# Patient Record
Sex: FEMALE | Race: WHITE | NOT HISPANIC OR LATINO | Employment: PART TIME
[De-identification: names, ages, dates, MRNs, and addresses within clinical notes are randomized per-mention and may not be internally consistent; named-entity substitution may affect disease eponyms.]

---

## 2017-10-22 ENCOUNTER — HEALTH MAINTENANCE LETTER (OUTPATIENT)
Age: 32
End: 2017-10-22

## 2020-02-24 ENCOUNTER — HEALTH MAINTENANCE LETTER (OUTPATIENT)
Age: 35
End: 2020-02-24

## 2020-12-13 ENCOUNTER — HEALTH MAINTENANCE LETTER (OUTPATIENT)
Age: 35
End: 2020-12-13

## 2021-03-01 ENCOUNTER — HOSPITAL ENCOUNTER (OUTPATIENT)
Dept: MEDSURG UNIT | Facility: CLINIC | Age: 36
Discharge: HOME | End: 2021-03-02
Attending: ADVANCED PRACTICE MIDWIFE | Admitting: OBSTETRICS & GYNECOLOGY

## 2021-03-01 ASSESSMENT — MIFFLIN-ST. JEOR: SCORE: 1498.75

## 2021-03-03 ENCOUNTER — HOSPITAL ENCOUNTER (OUTPATIENT)
Dept: MEDSURG UNIT | Facility: CLINIC | Age: 36
Discharge: HOME | End: 2021-03-03
Attending: MIDWIFE | Admitting: OBSTETRICS & GYNECOLOGY

## 2021-03-03 ASSESSMENT — MIFFLIN-ST. JEOR: SCORE: 1503.28

## 2021-03-11 ENCOUNTER — COMMUNICATION - HEALTHEAST (OUTPATIENT)
Dept: SCHEDULING | Facility: CLINIC | Age: 36
End: 2021-03-11

## 2021-03-11 ENCOUNTER — ANESTHESIA - HEALTHEAST (OUTPATIENT)
Dept: OBGYN | Facility: CLINIC | Age: 36
End: 2021-03-11

## 2021-03-14 ENCOUNTER — RECORDS - HEALTHEAST (OUTPATIENT)
Dept: ADMINISTRATIVE | Facility: OTHER | Age: 36
End: 2021-03-14

## 2021-04-17 ENCOUNTER — HEALTH MAINTENANCE LETTER (OUTPATIENT)
Age: 36
End: 2021-04-17

## 2021-06-05 VITALS — WEIGHT: 185 LBS | BODY MASS INDEX: 32.78 KG/M2 | HEIGHT: 63 IN

## 2021-06-05 VITALS — WEIGHT: 184 LBS | HEIGHT: 63 IN | BODY MASS INDEX: 32.6 KG/M2

## 2021-06-15 NOTE — ANESTHESIA PROCEDURE NOTES
Epidural Block    Patient location during procedure: OB  Time Called: 3/11/2021 1:58 AM  Reason for Block:labor epidural  Staffing:  Performing  Anesthesiologist: Cristian Figueroa MD  Preanesthetic Checklist  Completed: patient identified, risks, benefits, and alternatives discussed, timeout performed, consent obtained, at patient's request, airway assessed, oxygen available, suction available, emergency drugs available and hand hygiene performed  Procedure  Patient position: sitting  Prep: ChloraPrep  Patient monitoring: continuous pulse oximetry and blood pressure  Approach: midline  Location: L3-L4  Injection technique: ESPERANZA air  Number of Attempts:2  Needle  Needle type: Tuohy   Needle gauge: 18 G     Catheter in Space: 4  Assessment  Sensory level:  No complications      Additional Notes:  Placed on 2nd attempt, catheter threaded easily without pain or paresthesias, negative aspiration and test dose negative. Patient more comfortable after procedure.

## 2021-06-15 NOTE — ANESTHESIA POSTPROCEDURE EVALUATION
Patient: Mary Shane  * No procedures listed *  Anesthesia type: epidural    Patient location: Labor and Delivery  Last vitals: No vitals data found for the desired time range.    Post vital signs: stable  Level of consciousness: awake and responds to simple questions  Post-anesthesia pain: pain controlled  Post-anesthesia nausea and vomiting: no  Pulmonary: unassisted, return to baseline  Cardiovascular: stable and blood pressure at baseline  Hydration: adequate  Anesthetic events: no    QCDR Measures:  ASA# 11 - Sania-op Cardiac Arrest: ASA11B - Patient did NOT experience unanticipated cardiac arrest  ASA# 12 - Sania-op Mortality Rate: ASA12B - Patient did NOT die  ASA# 13 - PACU Re-Intubation Rate: NA - No ETT / LMA used for case  ASA# 10 - Composite Anes Safety: ASA10A - No serious adverse event    Additional Notes:

## 2021-06-15 NOTE — PROGRESS NOTES
Patient arrives in triage with complaints of regular uterine contractions. She rates contractions at a 3 on the pain scale. Palpates mild. FHR category 1. Cervix unchanged from clinic and also unchanged after spending an hour in triage. Patient offered vistaril, but declined. Early labor signs reviewed with patient. Patient and  verbalize understanding and deny further questions at this time. Discharged home in stable condition, per Nita Guillory CNM.

## 2021-06-15 NOTE — PROGRESS NOTES
FHR remains reactive category 1 tracing. Patient continues to contract every 2-6 minutes, pal[singleton mild, patient does not feel contractions. SKYLER Juan CNM consulted with Dr. Mullen OB/GYN. Plan to discharge patient to home at this time to follow-up in clinic on Friday.

## 2021-06-15 NOTE — ANESTHESIA PREPROCEDURE EVALUATION
Anesthesia Evaluation      Patient summary reviewed   No history of anesthetic complications     Airway   Mallampati: II  Neck ROM: full   Pulmonary - negative ROS and normal exam                          Cardiovascular - negative ROS and normal exam   Neuro/Psych - negative ROS     Endo/Other    (+) obesity, pregnant     GI/Hepatic/Renal - negative ROS           Dental - normal exam                        Anesthesia Plan  Planned anesthetic: epidural  Patient identified, chart reviewed, and patient interviewed and examined. Benefits and risks of procedure discussed including bleeding, infection, nerve injury, hypotension, unilateral or failed block, headache. Patient desires to proceed. Time out performed prior to block placement and infusion initiation.       ASA 2     Anesthetic plan and risks discussed with: patient

## 2021-06-15 NOTE — DISCHARGE SUMMARY
"S: Mary is a 36yo  at 39w6d presented to l/d with complaints of regular contractions.  Pt reports that contractions do not feel very strong.  Pt is accompanied by her .  O: /82 (Patient Position: Semi-shaikh, Cuff Size: Adult Regular)   Pulse 79   Temp 98.6  F (37  C) (Oral)   Resp 20   Ht 5' 3\" (1.6 m)   Wt 184 lb (83.5 kg)   BMI 32.59 kg/m    FHR Cat I  Ctx q 3-6min mild to palp  Cx FT/thick/high  A: IUP at term with contractions  P: No cervical change after one hour  Reviewed active labor with pt and her   Pt is agreeable to go home  Pt declines vistaril for rest  Pt is to f/u in clinic this week    Nita Guillory CNM  Total time with pt =15min including reviewing chart, direct patient care and documentation.  "

## 2021-06-15 NOTE — PROGRESS NOTES
BPP 6/8 with 0 points for fetal tone. SKYLER Juan CNM notified. CNM has consulted with Dr. Mullen OB/GYN who recommends further observation x 1 hour and re-evaluate. Patient notified of above. Diet order obtained, menu given.

## 2021-06-15 NOTE — PROGRESS NOTES
"Outpatient/Triage Note:    Patient Name:  Mary Shane  :      1985  MRN:      [id number]    Assessment:   @ 40w0d   Repeat BPP at [facility]  (off for tone)  Extended monitoring with category one tracing with many accels    Plan:     - Discharge to home undelivered. Reviewed warning signs including decreased fetal movement, leaking of fluid, vaginal bleeding, or signs of labor. Reviewed how to contact on-call CNM. Follow-up in clinic on Friday 3/5/21 with CNM as scheduled or sooner as needed. All questions answered. Agrees with plan.     Subjective  Mary Shane is a 35 y.o.  who presented to [facility] for evaluation of failed BPP in clinic. Patient endorses \"lots of fetal movement\" once on monitors in triage.  Many fetal movements visualized and palpated by Rn/CNM. Denies leaking of fluid, bleeding, or changes in fetal movement.     Objective  Temp:  [98.1  F (36.7  C)] 98.1  F (36.7  C)  Heart Rate:  [58] 58  Resp:  [16] 16  BP: (129)/(80) 129/80    Physical Exam:  General appearance:  comfortable  Psych: AAO x3  Skin: Pink, warm & dry  HEENT: unremarkable  Cardiovascular:  RRR, S1, S2, no extra sounds or murmurs  Respiratory:  breath sounds CTA bilaterally, anteriorly and posteriorly  Abdomen: soft, NT, gravid  Leopolds: Vertex         EFW:<4500 grams (AGA)     FHR:Baseline: 130 bpm, Variability: Moderate (6 - 25 bpm), Accelerations: \"present and Decelerations: Absent    Uterine contractions:TocoFrequency: Every 2-7 minutes, Duration: 60 seconds and Intensity: mild    SVE: very posterior, unable to palpate    Discussed maternal/fetal status, NST and BPP with Dr. Mullen. OB recommends extended monitoring x 1 hr following hospital BPP and f/u in clinic on Friday 3/5/21.       Total time spent with patient:15 minutes, >50% spent on counseling and coordination of care.    Provider:  SARWAT Grayson,RUBIA    Date:  3/3/2021  Time:  2:36 PM              "

## 2021-06-15 NOTE — PROGRESS NOTES
, EDC 3-3-21, presents to [facility] triage,  accompanied by spouse,after BPP in clinic of . EFM placed. Reactive category 1 tracing obtained. Contractions noted every 2-6 minutes. Palpate mild, patient does not feel them. Hydration offered to patient and spouse, they decline at this time. Repeat BPP ordered. SKYLER Juan CNM notified of above findings.

## 2021-06-16 PROBLEM — Z34.90 PREGNANT: Status: ACTIVE | Noted: 2021-03-10

## 2021-09-26 ENCOUNTER — HEALTH MAINTENANCE LETTER (OUTPATIENT)
Age: 36
End: 2021-09-26

## 2022-05-08 ENCOUNTER — HEALTH MAINTENANCE LETTER (OUTPATIENT)
Age: 37
End: 2022-05-08

## 2023-01-14 ENCOUNTER — HEALTH MAINTENANCE LETTER (OUTPATIENT)
Age: 38
End: 2023-01-14

## 2023-06-02 ENCOUNTER — HEALTH MAINTENANCE LETTER (OUTPATIENT)
Age: 38
End: 2023-06-02